# Patient Record
(demographics unavailable — no encounter records)

---

## 2024-12-18 NOTE — REVIEW OF SYSTEMS
[As Noted in HPI] : as noted in HPI [Ear Pain] : ear pain [Hearing Loss] : hearing loss [Ear Noises] : ear noises [Patient Intake Form Reviewed] : Patient intake form was reviewed [Negative] : Heme/Lymph [Ear Itch] : no ear itch [Dizziness] : no dizziness [Vertigo] : no vertigo [Recurrent Ear Infections] : no recurrent ear infections [Ear Drainage] : no ear drainage [Lightheadedness] : no lightheadedness

## 2024-12-18 NOTE — HISTORY OF PRESENT ILLNESS
[de-identified] : 21 year old male presents for Left ear issue No hx reg ears Duration 3 days- woke up with Left ear pain and muffled hearing Pain when chewing Uses Q tips Intermittent tinnitus Fam hx of hearing loss Advil for pain Denies itchiness, vertigo

## 2024-12-18 NOTE — CONSULT LETTER
[Consult Letter:] : I had the pleasure of evaluating your patient, [unfilled]. [Please see my note below.] : Please see my note below. [Consult Closing:] : Thank you very much for allowing me to participate in the care of this patient.  If you have any questions, please do not hesitate to contact me. [Sincerely,] : Sincerely, [FreeTextEntry1] : Dear Dr. ELSIE CORDOBA,  Thank you for your kind referral. Please refer to my enclosed office notes for MIKE GARZA . If there are any questions free to contact me. [FreeTextEntry3] : Tobias Austin MD, FACS

## 2024-12-18 NOTE — PROCEDURE
[Cerumen Impaction] : Cerumen Impaction [FreeTextEntry6] : Indication: ear plugging and discomfort Large amount cerumen cleared left and right ear instrumentation with curettes, forceps and suction. Ear canals and tympanic membranes  unremarkable.as canal swollen shut

## 2024-12-18 NOTE — REASON FOR VISIT
[Initial Consultation] : an initial consultation for [Family Member] : family member [FreeTextEntry2] : Left ear issues